# Patient Record
Sex: MALE | Race: BLACK OR AFRICAN AMERICAN | Employment: UNEMPLOYED | URBAN - METROPOLITAN AREA
[De-identification: names, ages, dates, MRNs, and addresses within clinical notes are randomized per-mention and may not be internally consistent; named-entity substitution may affect disease eponyms.]

---

## 2018-06-21 ENCOUNTER — HOSPITAL ENCOUNTER (EMERGENCY)
Age: 28
Discharge: HOME | End: 2018-06-21
Attending: EMERGENCY MEDICINE | Admitting: EMERGENCY MEDICINE
Payer: MEDICAID

## 2018-06-21 VITALS
OXYGEN SATURATION: 96 % | TEMPERATURE: 98.1 F | RESPIRATION RATE: 16 BRPM | DIASTOLIC BLOOD PRESSURE: 58 MMHG | HEART RATE: 70 BPM | WEIGHT: 132 LBS | SYSTOLIC BLOOD PRESSURE: 108 MMHG | HEIGHT: 65 IN | BODY MASS INDEX: 21.99 KG/M2

## 2018-06-21 DIAGNOSIS — R45.1 AGITATION: Primary | ICD-10-CM

## 2018-06-21 PROCEDURE — 99283 EMERGENCY DEPT VISIT LOW MDM: CPT

## 2018-06-21 NOTE — ED TRIAGE NOTES
Pt arrived via EMS in four point restraints and with police at bedside. Pt was noted to be running in and out of traffic. Pt got into a physical altercation with police and is now pending legal charges per [employer]. Pt has hx of schizophrenia and has not taken medication in one month. Pt denies SI/HI and seeing/hearing voices. Pt has no complaints at this time.

## 2018-06-21 NOTE — ED NOTES
I have reviewed discharge instructions with the patient. The patient verbalized understanding. Pt discharged into police custody.

## 2018-06-21 NOTE — ED PROVIDER NOTES
EMERGENCY DEPARTMENT HISTORY AND PHYSICAL EXAM    6:03 PM      Date: 6/21/2018  Patient Name: Yuriy Gutierrez    History of Presenting Illness     Chief Complaint   Patient presents with   [address] Problem     HPI limited due to altered mental status    History Provided By: Patient and Police      Additional History (Context): Yuriy Gutierrez is a 33 y.o. male with a history of schizophrenia not currently on medications who presents with an altered mental status today. Patient was found earlier today unresponsive and states he was taking a nap. The patient was then seen later today running in traffic. Patient was then detained by police and require physical restrain after extreme agitation and assault towards police . Patient denies any pain and has no complaints. Denies SI.     PCP: None        Past History     Past Medical History:  History reviewed. No pertinent past medical history. Past Surgical History:  History reviewed. No pertinent surgical history. Family History:  History reviewed. No pertinent family history. Social History:  Social History   Substance Use Topics    Smoking status: Never Smoker    Smokeless tobacco: Never Used    Alcohol use Yes      Comment: occassionally       Allergies:  No Known Allergies      Review of Systems       Review of Systems   Unable to perform ROS: Mental status change         Physical Exam     Visit Vitals    /58    Pulse 70    Temp 98.1 °F (36.7 °C)    Resp 16    Ht 5' 5\" (1.651 m)    Wt 59.9 kg (132 lb)    SpO2 96%    BMI 21.97 kg/m2         Physical Exam   Constitutional: He is oriented to person, place, and time. He appears well-developed and well-nourished. No distress. HENT:   Head: Normocephalic and atraumatic. Mouth/Throat: Oropharynx is clear and moist.   Eyes: Pupils are equal, round, and reactive to light. No scleral icterus. Neck: Neck supple. No tracheal deviation present.    Cardiovascular: Normal rate and regular rhythm. No murmur heard. Pulmonary/Chest: Effort normal and breath sounds normal. No respiratory distress. Abdominal: Soft. There is no tenderness. Musculoskeletal: Normal range of motion. He exhibits no edema or deformity. Neurological: He is alert and oriented to person, place, and time. No gross neuro deficit   Skin: Skin is warm and dry. No rash noted. He is not diaphoretic. Psychiatric:   Odd affect. Easily agitated. Nursing note and vitals reviewed. Diagnostic Study Results     Labs -  Labs Reviewed - No data to display    Radiologic Studies -   No orders to display         Medical Decision Making   I am the first provider for this patient. I reviewed the vital signs, available nursing notes, past medical history, past surgical history, family history and social history. Vital Signs-Reviewed the patient's vital signs. Pulse Oximetry Analysis -  100% on room air (Interpretation) wnl    Records Reviewed: Nursing Notes and Old Medical Records (Time of Review: 6:03 PM)    ED Course: Progress Notes, Reevaluation, and Consults:  ED Course       Provider Notes (Medical Decision Making):     DDX: Underlying psychiatric d/o, substance intoxication, ETOH intoxication, medication non-compliance     Brought in by police for medical screening exam. Pt in NAD and has no complaints with normal vital signs. May have underlying psychiatric d/o and/or acute substance intoxication. Pt refuses any blood work or urine analysis. Pt will be discharged into police custody. The patient was given:  Medications - No data to display    Diagnosis     Clinical Impression:   1.  Agitation        Disposition: Discharged    Follow-up Information     Follow up With Details Comments Contact Info    [facility] EMERGENCY DEPT  If symptoms worsen [address]  [phone]    [facility]  For any needs regarding psychiatric or alcohol/drug detoxification resources  [address]  [phone]           There are no discharge medications for this patient.    _______________________________      Radha Phillip acting as a scribe for and in the presence of Chin Terrell DO      June 21, 2018 at [facility]       Provider Attestation:      I personally performed the services described in the documentation, reviewed the documentation, as recorded by the scribe in my presence, and it accurately and completely records my words and actions.  June 21, 2018 at [facility] Katie Trejo, DO

## 2020-01-12 ENCOUNTER — HOSPITAL ENCOUNTER (EMERGENCY)
Age: 30
Discharge: LEFT BEFORE DISCHARGE | End: 2020-01-13
Attending: EMERGENCY MEDICINE
Payer: MEDICAID

## 2020-01-12 DIAGNOSIS — F25.9 SCHIZOAFFECTIVE DISORDER, UNSPECIFIED TYPE (HCC): Primary | ICD-10-CM

## 2020-01-12 DIAGNOSIS — F12.10 MARIJUANA ABUSE: ICD-10-CM

## 2020-01-12 LAB
ALBUMIN SERPL-MCNC: 4 G/DL (ref 3.4–5)
ALBUMIN/GLOB SERPL: 1.3 {RATIO} (ref 0.8–1.7)
ALP SERPL-CCNC: 50 U/L (ref 45–117)
ALT SERPL-CCNC: 23 U/L (ref 16–61)
AMPHET UR QL SCN: NEGATIVE
ANION GAP SERPL CALC-SCNC: 5 MMOL/L (ref 3–18)
AST SERPL-CCNC: 37 U/L (ref 10–38)
BARBITURATES UR QL SCN: NEGATIVE
BASOPHILS # BLD: 0 K/UL (ref 0–0.1)
BASOPHILS NFR BLD: 0 % (ref 0–2)
BENZODIAZ UR QL: NEGATIVE
BILIRUB SERPL-MCNC: 0.5 MG/DL (ref 0.2–1)
BUN SERPL-MCNC: 15 MG/DL (ref 7–18)
BUN/CREAT SERPL: 15 (ref 12–20)
CALCIUM SERPL-MCNC: 8.8 MG/DL (ref 8.5–10.1)
CANNABINOIDS UR QL SCN: POSITIVE
CHLORIDE SERPL-SCNC: 109 MMOL/L (ref 100–111)
CO2 SERPL-SCNC: 27 MMOL/L (ref 21–32)
COCAINE UR QL SCN: NEGATIVE
CREAT SERPL-MCNC: 0.97 MG/DL (ref 0.6–1.3)
DIFFERENTIAL METHOD BLD: ABNORMAL
EOSINOPHIL # BLD: 0.1 K/UL (ref 0–0.4)
EOSINOPHIL NFR BLD: 1 % (ref 0–5)
ERYTHROCYTE [DISTWIDTH] IN BLOOD BY AUTOMATED COUNT: 12.6 % (ref 11.6–14.5)
ETHANOL SERPL-MCNC: <3 MG/DL (ref 0–3)
GLOBULIN SER CALC-MCNC: 3 G/DL (ref 2–4)
GLUCOSE SERPL-MCNC: 83 MG/DL (ref 74–99)
HCT VFR BLD AUTO: 38.8 % (ref 36–48)
HDSCOM,HDSCOM: ABNORMAL
HGB BLD-MCNC: 12.9 G/DL (ref 13–16)
LYMPHOCYTES # BLD: 2.4 K/UL (ref 0.9–3.6)
LYMPHOCYTES NFR BLD: 43 % (ref 21–52)
MCH RBC QN AUTO: 28.9 PG (ref 24–34)
MCHC RBC AUTO-ENTMCNC: 33.2 G/DL (ref 31–37)
MCV RBC AUTO: 87 FL (ref 74–97)
METHADONE UR QL: NEGATIVE
MONOCYTES # BLD: 0.5 K/UL (ref 0.05–1.2)
MONOCYTES NFR BLD: 9 % (ref 3–10)
NEUTS SEG # BLD: 2.7 K/UL (ref 1.8–8)
NEUTS SEG NFR BLD: 47 % (ref 40–73)
OPIATES UR QL: NEGATIVE
PCP UR QL: NEGATIVE
PLATELET # BLD AUTO: 190 K/UL (ref 135–420)
PMV BLD AUTO: 10 FL (ref 9.2–11.8)
POTASSIUM SERPL-SCNC: 3.9 MMOL/L (ref 3.5–5.5)
PROT SERPL-MCNC: 7 G/DL (ref 6.4–8.2)
RBC # BLD AUTO: 4.46 M/UL (ref 4.7–5.5)
SODIUM SERPL-SCNC: 141 MMOL/L (ref 136–145)
WBC # BLD AUTO: 5.7 K/UL (ref 4.6–13.2)

## 2020-01-12 PROCEDURE — 99284 EMERGENCY DEPT VISIT MOD MDM: CPT

## 2020-01-12 PROCEDURE — 80307 DRUG TEST PRSMV CHEM ANLYZR: CPT

## 2020-01-12 PROCEDURE — 85025 COMPLETE CBC W/AUTO DIFF WBC: CPT

## 2020-01-12 PROCEDURE — 80053 COMPREHEN METABOLIC PANEL: CPT

## 2020-01-12 RX ORDER — BENZTROPINE MESYLATE 1 MG/1
1 TABLET ORAL 2 TIMES DAILY
Status: DISCONTINUED | OUTPATIENT
Start: 2020-01-13 | End: 2020-01-13 | Stop reason: HOSPADM

## 2020-01-12 NOTE — ED NOTES
Mother states patient has been walking around the house talking to himself. Mother also state patient is having visual hallucinations and is paranoid stating the \"the world is coming to an end\". Mother also states patient reported seeing people in the [place]. Mother states she is concerned that the patient is not taking medications correctly or the the medications are not working and requests the patient go inpatient to get him stabilization on his medications. Per mother this is a deviation of the patient normal behavior.

## 2020-01-12 NOTE — ED PROVIDER NOTES
EMERGENCY DEPARTMENT HISTORY AND PHYSICAL EXAM    Date: 1/12/2020  Patient Name: Willie Olsen    History of Presenting Illness     Chief Complaint   Patient presents with    Mental Health Problem         History Provided By: Patient and Patient's Mother    Chief Complaint: auditory hallucinations  Duration: 1 Weeks  Timing:  Gradual and Worsening  Location: global  Quality: n/a  Severity: Moderate  Modifying Factors: has not taken psych meds x several days  Associated Symptoms: denies any other associated signs or symptoms      HPI: Willie Olsen is a 30 y.o. male with a PMH of Schizophrenia who presents to the ER with his family for evaluation of auditory hallucinations. Patient states he has been without his psychiatric medications for about 3 days. He is requesting to be admitted at a psychiatric hospital because he just does not feel his normal self. Family at bedside reports increased hallucinations over the last few weeks and they think his medicine may not be strong enough. Patient denied any suicidal or homicidal ideations. He has no other symptoms or complaints. PCP: None        Past History     Past Medical History:  No past medical history on file. Past Surgical History:  No past surgical history on file. Family History:  No family history on file. Social History:  Social History     Tobacco Use    Smoking status: Never Smoker    Smokeless tobacco: Never Used   Substance Use Topics    Alcohol use: Yes     Comment: occassionally    Drug use: Not on file       Allergies:  No Known Allergies      Review of Systems   Review of Systems   Constitutional: Negative for chills, fatigue and fever. HENT: Negative. Negative for sore throat. Eyes: Negative. Respiratory: Negative for cough and shortness of breath. Cardiovascular: Negative for chest pain and palpitations. Gastrointestinal: Negative for abdominal pain, nausea and vomiting.    Genitourinary: Negative for dysuria. Musculoskeletal: Negative. Skin: Negative. Neurological: Negative for dizziness, weakness, light-headedness and headaches. Psychiatric/Behavioral: Positive for hallucinations. Negative for suicidal ideas. All other systems reviewed and are negative. Physical Exam     Vitals:    01/12/20 1749   BP: 120/82   Pulse: 65   Resp: 16   Temp: 99.2 °F (37.3 °C)   SpO2: 100%     Physical Exam  Vitals signs and nursing note reviewed. Constitutional:       General: He is not in acute distress. Appearance: Normal appearance. He is well-developed. HENT:      Head: Normocephalic and atraumatic. Mouth/Throat:      Mouth: Mucous membranes are moist.   Eyes:      General: No scleral icterus. Conjunctiva/sclera: Conjunctivae normal.   Neck:      Musculoskeletal: Normal range of motion and neck supple. Vascular: No JVD. Trachea: No tracheal deviation. Cardiovascular:      Rate and Rhythm: Normal rate and regular rhythm. Heart sounds: Normal heart sounds. Pulmonary:      Effort: Pulmonary effort is normal. No respiratory distress. Breath sounds: Normal breath sounds. No stridor. No wheezing, rhonchi or rales. Abdominal:      Palpations: Abdomen is soft. Tenderness: There is no tenderness. Musculoskeletal: Normal range of motion. Skin:     General: Skin is warm and dry. Capillary Refill: Capillary refill takes less than 2 seconds. Neurological:      Mental Status: He is alert and oriented to person, place, and time. GCS: GCS eye subscore is 4. GCS verbal subscore is 5. GCS motor subscore is 6. Psychiatric:         Attention and Perception: He perceives auditory and visual hallucinations. Mood and Affect: Mood normal.         Speech: Speech normal.         Behavior: Behavior normal. Behavior is cooperative. Thought Content: Thought content is delusional. Thought content is not paranoid.  Thought content does not include homicidal or suicidal ideation. Thought content does not include homicidal or suicidal plan. Diagnostic Study Results     Labs -     Recent Results (from the past 12 hour(s))   DRUG SCREEN, URINE    Collection Time: 01/12/20  6:21 PM   Result Value Ref Range    BENZODIAZEPINES NEGATIVE  NEG      BARBITURATES NEGATIVE  NEG      THC (TH-CANNABINOL) POSITIVE (A) NEG      OPIATES NEGATIVE  NEG      PCP(PHENCYCLIDINE) NEGATIVE  NEG      COCAINE NEGATIVE  NEG      AMPHETAMINES NEGATIVE  NEG      METHADONE NEGATIVE  NEG      HDSCOM (NOTE)    ETHYL ALCOHOL    Collection Time: 01/12/20  6:44 PM   Result Value Ref Range    ALCOHOL(ETHYL),SERUM <3 0 - 3 MG/DL   CBC WITH AUTOMATED DIFF    Collection Time: 01/12/20  6:44 PM   Result Value Ref Range    WBC 5.7 4.6 - 13.2 K/uL    RBC 4.46 (L) 4.70 - 5.50 M/uL    HGB 12.9 (L) 13.0 - 16.0 g/dL    HCT 38.8 36.0 - 48.0 %    MCV 87.0 74.0 - 97.0 FL    MCH 28.9 24.0 - 34.0 PG    MCHC 33.2 31.0 - 37.0 g/dL    RDW 12.6 11.6 - 14.5 %    PLATELET 761 543 - 168 K/uL    MPV 10.0 9.2 - 11.8 FL    NEUTROPHILS 47 40 - 73 %    LYMPHOCYTES 43 21 - 52 %    MONOCYTES 9 3 - 10 %    EOSINOPHILS 1 0 - 5 %    BASOPHILS 0 0 - 2 %    ABS. NEUTROPHILS 2.7 1.8 - 8.0 K/UL    ABS. LYMPHOCYTES 2.4 0.9 - 3.6 K/UL    ABS. MONOCYTES 0.5 0.05 - 1.2 K/UL    ABS. EOSINOPHILS 0.1 0.0 - 0.4 K/UL    ABS.  BASOPHILS 0.0 0.0 - 0.1 K/UL    DF AUTOMATED     METABOLIC PANEL, COMPREHENSIVE    Collection Time: 01/12/20  6:44 PM   Result Value Ref Range    Sodium 141 136 - 145 mmol/L    Potassium 3.9 3.5 - 5.5 mmol/L    Chloride 109 100 - 111 mmol/L    CO2 27 21 - 32 mmol/L    Anion gap 5 3.0 - 18 mmol/L    Glucose 83 74 - 99 mg/dL    BUN 15 7.0 - 18 MG/DL    Creatinine 0.97 0.6 - 1.3 MG/DL    BUN/Creatinine ratio 15 12 - 20      GFR est AA >60 >60 ml/min/1.73m2    GFR est non-AA >60 >60 ml/min/1.73m2    Calcium 8.8 8.5 - 10.1 MG/DL    Bilirubin, total 0.5 0.2 - 1.0 MG/DL    ALT (SGPT) 23 16 - 61 U/L    AST (SGOT) 37 10 - 38 U/L Alk. phosphatase 50 45 - 117 U/L    Protein, total 7.0 6.4 - 8.2 g/dL    Albumin 4.0 3.4 - 5.0 g/dL    Globulin 3.0 2.0 - 4.0 g/dL    A-G Ratio 1.3 0.8 - 1.7         Radiologic Studies -   No orders to display     CT Results  (Last 48 hours)    None        CXR Results  (Last 48 hours)    None            Medical Decision Making   I am the first provider for this patient. I reviewed the vital signs, available nursing notes, past medical history, past surgical history, family history and social history. Vital Signs-Reviewed the patient's vital signs. Records Reviewed: Nursing Notes and Old Medical Records     937 PM  61-year-old male with history of schizophrenia presents the ER with his caregiver complaining of hallucinations and running out of his psychiatric medications. Unable to recall what he takes regularly. Has not taken them for 3 days. Normally managed by Julián SWAIN. Family member at bedside states patient has been hallucinating and hearing things that are not real.  Patient is expressing need to be admitted to a psychiatric hospital to fix his medication regimen. Denied any suicidal or homicidal ideation cooperative in nature. We will plan on labs, medical clearance and psychiatric evaluation at this time. Genna Bentley PA-C     12:19 AM  Patient seen by tele-psychiatry and recommended for inpatient voluntary admission for stabilization of his schizophrenia and re-dosing of his medications. Discussed patient with ER attending, Dr. Melissa Cummings who will continue with patient care at shift change. Charge nurse made aware of need for voluntary placement. Consult placed for case management. Genna Bentley PA-C      Disposition:  Transfer for Psych      Follow-up Information    None         There are no discharge medications for this patient. Provider Notes (Medical Decision Making):     Procedures:  Procedures        Diagnosis     Clinical Impression:   1.  Schizoaffective disorder, unspecified type ([facility]    2.  Marijuana abuse

## 2020-01-13 VITALS
DIASTOLIC BLOOD PRESSURE: 74 MMHG | SYSTOLIC BLOOD PRESSURE: 109 MMHG | RESPIRATION RATE: 16 BRPM | OXYGEN SATURATION: 100 % | HEART RATE: 80 BPM | TEMPERATURE: 97.6 F

## 2020-01-13 NOTE — ED NOTES
Patient up to Nurse's station, states that he was tired of waiting. Requesting his clothes. Day shift charge RN attempting to talk to patient but patient refusing to talk and continues to request his clothes. Patient then runs out of the ambulance bay doors in the rain. Non emergency police called.

## 2020-01-13 NOTE — ED NOTES
Verbal shift change report given to Abdirahman (oncoming nurse) by Alex Kinney (offgoing nurse). Report included the following information SBAR, ED Summary and MAR.

## 2020-01-13 NOTE — ED NOTES
Patient requesting something to drink. Patient took one sip of the soda provided to him and stated that it was nasty and that he did not want it. Asked patient if he would like anything else, patient stated no. No

## 2020-01-13 NOTE — ED NOTES
Called [facility] and [facility], states that they are at capacity.

## 2020-01-13 NOTE — CONSULTS
Name:           Juliet Madrigal                  : 1990  Date:   2020                                    Time:   Location of patient: [facility]             Location of doctor: [place], [place]  This evaluation was conducted via tele psychiatry with the assistance of onsite staff        Chief Complaint: schizoaffective d/o  History of Present Illness:      pt 29 y/o AAM with hx of schizoaffective d/o along with cannabis use d/o.  reports hx of chronic THC usage. patient reports that he's been having issues with paranoid thoughts, reports that there were people after him.  states that the world is going to come to an end and that he's been seeing aliens. Reports that he had also been seeing people who are not present in the room. Reports that patient had been non-compliant with his medications x 1 week and follow-up. reports that he had lost his medications. denies any active si hi +avh +paranoia        Collateral: none  SI/ Self harm: denies  HI/Violence: none  Trauma history: (e.g. sexual, physical, domestic violence) none  Access to weapons: none  Legal: none  Psychiatric History/Treatment History:   reports outpatinet follow-up with Juan Christianson,  reports wa last been by his PCP 4 days ago. Drug/Alcohol History:   uds +thc daily usage. reports occ alcohol usage with beer  denies any hx of aa or rehab in the past  1/2 ppd tobacco usage      medication  Hx:  invega and cogentin  No current facility-administered medications for this encounter. No current outpatient medications on file. Past Medical History:  No past medical history on file.        Allergies:   No Known Allergies          Sleep: Quantity: 3hrs   Quality: insomnia  Family Psych History/History of suicide: Non-contributory        Social History:   lives with 3 family members  unemployed on disbality  no legal issues  no hx of abuse reported  Social History     Socioeconomic History    Marital status: SINGLE     Spouse name: Not on file    Number of children: Not on file    Years of education: Not on file    Highest education level: Not on file   Tobacco Use    Smoking status: Never Smoker    Smokeless tobacco: Never Used   Substance and Sexual Activity    Alcohol use: Yes     Comment: occassionally                  Mental Status Exam:      Level of alertness: alert  Orientation: axox2  Appearance: disheveled  Mood: anxious  Affect: congruent  Gait: normal  Psychomotor State: normal  Attitude: guarded  SI/HI: no si no hi  Sleep:     Hours slept: 4     Sleep: sleep cont. insomnia  Speech concerns: minimal  Language: coherent logical  Thought processes: preservative, paranoid  Associations: appropriate  Thought content: coherent logical  Memory:     Short term: impaired     Long term: intact  Attention: fair  Concentration: fair  Intellect: normal  Fund of knowledge: fair  Insight/Judgment: insight fair, judgment fair           Visit Vitals  /82   Pulse 65   Temp 99.2 °F (37.3 °C)   Resp 16   SpO2 100%                      Impression/Risk Assessment:   pt 29 y/o AAM with hx of schizoaffective d/o thc use d/o.  patient continues to be a poor historian and continues with paranoia. denies any meth usage, non-complinat with meds x 7 days. 1. patient will be admitted for safety and stabilization, vol status  2.  will also have prn medications available. continue with meds invega 3 mg po qhs for psychosis. cogentin 1mg po bid for EPS  3. patient agreeable to the plan discussed above and willing to stay for treatment. Diagnosis:      Axis I: schizoaffective d/o thc use d/o  Axis II: deferred  Axis III:  none   Axis IV: social stressors, financial stressors, lack of primary support. Axis V: GAF: 33     Treatment Recommendations: (summarize recommendations  voluntary/involuntary for admissions)   1. patient will be admitted for safety and stabilization, vol status. 2.  will also have prn medications available. continue with meds invega for psychosis, cogentin for EPS     Psychiatric Clearance: NA  Observation level  no si and no hi thoughts, does no need 1:1 obs at this time. Pharmacological:   1.  invega 3 mg po qhs for psychosis, cogentin 1mg po bid for EPS  2.    Therapy: (specifically note recommended therapy, e.g. supportive, substance abuse, etc.)  Therapy  for coping skills and stress management     Level of Care: (inpatient, PHP, IOP, outpatient): inpatient

## 2022-10-31 NOTE — ED TRIAGE NOTES
Review and sign if appropriate \"I just need to be admitted so I can get my medications. They make me get admitted and see a psychiatrist again every time I run out. \" Per mom-\"He thought it was about to be the end of the world and that aliens were coming. \"  Patient denies suicidal or homicidal ideation.